# Patient Record
Sex: MALE | Race: WHITE
[De-identification: names, ages, dates, MRNs, and addresses within clinical notes are randomized per-mention and may not be internally consistent; named-entity substitution may affect disease eponyms.]

---

## 2017-07-31 NOTE — EDM.PDOC
ED HPI GENERAL MEDICAL PROBLEM





- General


Chief Complaint: General


Stated Complaint: L wrist pain


Time Seen by Provider: 07/31/17 18:10


Source of Information: Reports: Patient, Old Records ([facility] chart/EMR), Significant Other


History Limitations: Reports: No Limitations





- History of Present Illness


INITIAL COMMENTS - FREE TEXT/NARRATIVE: 





The patient was brought to the emergency room via private automobile by his 

significant other for evaluation of persistent intermittent left wrist pain, 

which is aggravated with flexion. He apparently had a Workmen's Compensation 

injury at about 16:00 hours on 7/27 when he sprained his left wrist while 

lifting a tire. He did report this to his employer the next day, however has 

not had this evaluated to this point. Symptoms of been refractory to ibuprofen 

800 mg, which was last taken 2 days ago and some additional topical ice 

therapy. He is right-handed and has not injured his left wrist in the past. He 

denies any paresthesias, neurological deficits, neck/back pain, weakness, or 

other complaints or injuries. He was able to complete his previous work shift 

and has also worked since the above injury without significant discomfort. The 

patient denies any chest pain/pressure, heart flutter, dizziness, orthostasis, 

orthopnea, diaphoresis, paresthesias, recent decreased exercise tolerance, or 

any other anginal-type symptoms. No recent history of abdominal pain, heartburn

, nausea, diarrhea, melena, gross hematochezia, or any food intolerance, 

including fatty foods, etc.. The patient also denies any recent fever, cough, 

wheezing, dyspnea, etc.. He complained of 9/10 pain yesterday with some 

improved 6/10 pain today.


Onset: Unknown/Unsure


Onset Date: 07/27/17


Onset Time: 16:00


Duration: Constant, Getting Worse


Location: Reports: Upper Extremity, Left


Quality: Reports: Same as Previous Episode, Sharp


Severity: Moderate


Improves with: Reports: Rest


Worsens with: Reports: Movement


Context: Reports: Trauma (As above)


Associated Symptoms: Denies: Confusion, Chest Pain, Cough, Diaphoresis, Fever/

Chills, Nausea/Vomiting, Shortness of Breath, Weakness


Treatments PTA: Reports: Cold Therapy, NSAIDS


  ** Left Wrist


Pain Score (Numeric/FACES): 6





- Related Data


 Allergies











Allergy/AdvReac Type Severity Reaction Status Date / Time


 


codeine Allergy  Other Verified 07/31/17 18:11














Past Medical History


HEENT History: Reports: None.  Denies: Allergic Rhinitis, Cataract, Glaucoma, 

Hard of Hearing, Impaired Vision, Macular Degeneration, Retinal Detachment


Cardiovascular History: Reports: None.  Denies: Afib, Aneurysm, Arrhythmia, 

Blood Clots/VTE/DVT, CAD, Heart Murmur, High Cholesterol, Hypertension, MI, 

Syncope


Respiratory History: Reports: Asthma, Other (See Below).  Denies: COPD, 

Intubation, Previous, PE, Pneumothorax, TB


Other Respiratory History: Asthma by chest x-ray on 10/17/16 with no previous 

evaluation


Gastrointestinal History: Reports: None.  Denies: Celiac Disease, Chronic 

Constipation, Chronic Diarrhea, Gastritis, GERD, GI Bleed, Hepatitis, 

Inflammatory Bowel Disease, Irritable Bowel Syndrome, Pancreatitis


Genitourinary History: Reports: STD, Other (See Below)


Other Genitourinary History: Chlamydia exposure on 12/12/15 with treatment


Musculoskeletal History: Reports: None.  Denies: Amputation, Arthritis, Back 

Pain, Chronic, Fracture, Gout, Neck Pain, Chronic, Osteoarthritis, RA, SLE


Neurological History: Reports: Brain Injury, Concussion, Head Trauma, Other (

See Below).  Denies: Alzheimers Disease, Cerebral Aneurysms, Headaches, Chronic

, Migraines, Seizure


Other Neuro History: Head concussion secondary to bull riding accident on 6/25/ 16


Psychiatric History: Reports: None.  Denies: Abuse, Victim of, ADD, ADHD, 

Addiction, Anxiety, Depression, Psych Hospitalization(s), PTSD, Suicide Attempt

, Suicidal Ideation


Endocrine/Metabolic History: Reports: None.  Denies: Diabetes, Type I, Diabetes

, Type II, Hypothyroidism, IDDM


Hematologic History: Reports: None.  Denies: Anemia, Blood Transfusion(s)


Immunologic History: Reports: None.  Denies: AIDS, HIV, SLE


Oncologic (Cancer) History: Reports: None.  Denies: Basal Cell Carcinoma, 

Hodgkin's Lymphoma, Leukemia, Lymphoma, Malignant Melanoma, Non-Hodgkin's 

Lymphoma, Squamous Cell Carcinoma


Dermatologic History: Reports: None.  Denies: Eczema, Psoriasis





- Infectious Disease History


Infectious Disease History: Reports: Chicken Pox.  Denies: C-Difficile, Measles

, Meningitis, Mononucleosis, MRSA, Mumps, Pertussis (Whooping Cough), Rheumatic 

Fever, RSV, Rubella, Scarlet Fever, Shingles, TB





- Past Surgical History


Head Surgeries/Procedures: Reports: None


HEENT Surgical History: Reports: Oral Surgery, Other (See Below).  Denies: 

Adenoidectomy, Eye Surgery, Laser Surgery, LASIK, Naso-Sinus Surgery, 

Tonsillectomy


Other HEENT Surgeries/Procedures: [place] teeth extraction 4 at about age 17, 

complete upper teeth extraction at about age 17


Cardiovascular Surgical History: Reports: None.  Denies: Varicose, Vascular 

Surgery


Respiratory Surgical History: Reports: None.  Denies: Lung Biopsies, 

Thoracentesis


GI Surgical History: Reports: None.  Denies: Appendectomy, Cholecystectomy, 

Colonoscopy, EGD, Hernia, Abdominal, Hernia, Inguinal, Hernia Repair/Other


Male  Surgical History: Reports: Circumcision, Other (See Below)


Other Male  Surgeries/Procedures: Circumcision as an infant


Endocrine Surgical History: Reports: None.  Denies: Thyroid Biopsy


Neurological Surgical History: Denies: C-Spine, Discectomy, Laminectomy, Lumbar 

Spine, Spinal Fusion, Vertebroplasty


Musculoskeletal Surgical History: Reports: None.  Denies: Arthroscopic Procedure

, Carpal Tunnel, Ganglion Cyst, Joint Replacement, ORIF, Shoulder Surgery


Oncologic Surgical History: Reports: None


Dermatological Surgical History: Reports: None





- Past Imaging History


Past Imaging History: Reports: CAT Scan (CT of the head and C-spine on 6/25/16, 

CT of the abdomen and pelvis on 9/20/15 and 6/25/16), MRA (Head on 6/27/16)





Social & Family History





- Tobacco Use


Smoking Status *Q: Current Every Day Smoker


Tobacco Use Within Last Twelve Months: Cigarettes


Years of Tobacco use: 7


Packs/Tins Daily: 1 (Smoking at age 14)


Used Tobacco, but Quit: No


Smoking Cessation Information Provided To Patient: Yes


Smoking Cessation Information Given Comment: Significant other smokes


Second Hand Smoke Exposure: Yes


Second Hand Smoke Education Provided: Yes





- Caffeine Use


Caffeine Use: Reports: Coffee (One cup per day), Energy Drinks (1 per month), 

Soda (2 sodas per day).  Denies: Tea


Caffeine Use Comment: Patient asked about one cup of coffee per day and 2 sodas 

per day





- Alcohol Use


Alcohol Use History: Yes


Days Per Week of Alcohol Use: 1 (No previous DWIs, problems with alcohol abuse, 

etc.)


Number of Drinks Per Day: 4 (Usually mixed drinks)


Total Drinks Per Week: 4


Alcohol Use in Last Twelve Months: Yes


Alcohol Use Frequency: Socially





- Recreational Drug Use


Recreational Drug Use: No


Drug Use in Last 12 Months: No


Recreational Drug Type: Denies: Amphetamines (Speed), Cocaine, LSD (Acid), 

Marijuana/Hashish, Methamphetamine, Morphine





- Sexual History


Sexual History: Reports: Multiple Partners, Other (See Below)


Other Sexual History Comment: Practicing safe sex at this time





- Living Situation & Occupation


Living situation: Reports: Single (No children), with Significant Other (And 

her son from another relationship)


Occupation: Employed (Previous , has been working at [employer] since 7/

10/17)





ED ROS GENERAL





- Review of Systems


Review Of Systems: ROS reveals no pertinent complaints other than HPI.





ED EXAM, GENERAL





- Physical Exam


Exam: See Below


Exam Limited By: No Limitations


General Appearance: Alert, WD/WN, No Apparent Distress


Neck: Normal Inspection, Supple, Non-Tender, Full Range of Motion.  No: 

Lymphadenopathy (L), Lymphadenopathy (R), Thyromegaly


Respiratory/Chest: No Respiratory Distress, Lungs Clear, Normal Breath Sounds, 

No Accessory Muscle Use, Chest Non-Tender.  No: Pleural Rub, Retractions


Cardiovascular: Normal Peripheral Pulses, Regular Rate, Rhythm, No Edema, No 

Gallop, No JVD, No Murmur, No Rub.  No: Gallop/S3, Gallop/S4, Friction Rub


Peripheral Pulses: 4+: Radial (L), Radial (R)


GI/Abdominal: Normal Bowel Sounds, Soft, Non-Tender, No Organomegaly, No 

Distention, No Abnormal Bruit, No Mass, Pelvis Stable.  No: Guarding


 (Male) Exam: Deferred


Rectal (Males) Exam: Deferred


Back Exam: Normal Inspection, Full Range of Motion.  No: CVA Tenderness (L), 

CVA Tenderness (R), Muscle Spasm


Extremities: Normal Range of Motion, No Pedal Edema, Normal Capillary Refill, 

Other (Mild palpation pain over the extensor surface of the left wrist with no 

crepitation, deformity, effusion, swelling, local signs of infection, etc. no 

snuffbox tenderness).  No: Joint Swelling, Kamlesh's Sign, Increased Warmth, 

Redness


Neurological: Alert, Oriented, CN II-XII Intact, Normal Cognition, Normal Gait, 

Normal Reflexes, No Motor/Sensory Deficits


Psychiatric: Normal Affect, Normal Mood


Skin Exam: Warm, Dry, Intact, Normal Color, No Rash, Tattoo(s) (Multiple).  No: 

Diaphoretic, Wound/Incision


Lymphatic: No Adenopathy





ED GENERAL MEDICAL PROCEDURES





- Splinting


  ** Left Upper Extremity


Pre-procedure NV status: Normal


Post-procedure NV status: Normal


Splint Material: Other (Cock up wrist splint)


Splint Design: Other (As above)


Applied & Form Fitted By: Nurse


Provider Post-Splint Application NV Check: NV Status Normal, Good Position


Complications: No





Course





- Vital Signs


Last Recorded V/S: 


 Last Vital Signs











Temp  37.3 C   07/31/17 18:15


 


Pulse  79   07/31/17 18:15


 


Resp  16   07/31/17 18:15


 


BP  142/82 H  07/31/17 18:15


 


Pulse Ox  98   07/31/17 18:15














 Vital Signs - 24 hr











  07/31/17





  18:15


 


Temperature [ 37.3 C





Temporal] 


 


Pulse, 79





Peripheral [ 





Pulse Oximetry] 


 


Respiratory 16





Rate 


 


Blood Pressure 142/82 H





[Left Upper Arm 





] 


 


O2 Sat by Pulse 98





Oximetry 














- Orders/Labs/Meds


Orders: 


 Active Orders 24 hr











 Category Date Time Status


 


 Wrist Comp Min 3V Lt [CR] Stat Exams  07/31/17 18:20 Taken


 


 Durable Medical Equipment for Discharge [DME for Oth  07/31/17 18:43 Ordered





 Discharge] [COMM] Routine   


 


 Obtain Past Medical Record [OM.PC] Routine Ot  07/31/17 18:20 Active











Labs: 


None


Meds: 


None





- Radiology Interpretation


Free Text/Narrative:: 





X-rays of the left wrist, complete, shows no evidence of fracture, dislocation, 

significant arthritic changes, etc.





Departure





- Departure


Time of Disposition: 19:00


Disposition: Home, Self-Care 01


Condition: Good


Clinical Impression: 


 Tobacco abuse counseling





Left wrist sprain


Qualifiers:


 Encounter type: initial encounter Qualified Code(s): S63.502A - Unspecified 

sprain of left wrist, initial encounter





Asthma


Qualifiers:


 Asthma severity: unspecified severity Asthma complication type: uncomplicated 

Qualified Code(s): J45.909 - Unspecified asthma, uncomplicated








- Discharge Information


Instructions:  Wrist Pain, Easy-to-Read, Wrist Sprain


Referrals: 


PCP,None [Primary Care Provider] - 


Forms:  ED Department Discharge, ED Return to Work/School Form


Additional Instructions: 


1.  Followup with your regular provider in 7-10 days as directed.


2.  Tylenol 650 mg by mouth every 4 hours and/or OTC ibuprofen 2-3 tabs by 

mouth every 6 hours with food as directed./needed.


3.  Work excuse- See Form


4.  BenGay or equivalent, heating pad, and/or ice packs as directed.


5.  Stop all tobacco use ASAP as directed/per provided information and consider 

contacting Quit LIne, etc..


6.  Stop all energy drink use ASAP


7.  Wear cock up wrist splint at all times with exception of bathing as 

discussed until otherwise directed by your regular provider





- Problem List & Annotations


(1) Left wrist sprain


SNOMED Code(s): 71981688


   Code(s): S63.502A - UNSPECIFIED SPRAIN OF LEFT WRIST, INITIAL ENCOUNTER   

Status: Acute   Priority: High   Onset Date: 07/27/17   Annotation/Comment:: 

Mild left wrist sprain. The patient feels that he can perform his normal duties 

without difficulty and has done so since the of injury as above. Symptomatic 

relief as per discharge instructions. Patient was placed in a cock up wrist 

splint, which should be worn at all times until otherwise directed. No activity 

restrictions, etc. for now per the patient's request. Work excuse and Workmen's 

Compensation forms were completed   


Qualifiers: 


   Encounter type: initial encounter   Qualified Code(s): S63.502A - 

Unspecified sprain of left wrist, initial encounter   





(2) Asthma


SNOMED Code(s): 734708216


   Code(s): J45.909 - UNSPECIFIED ASTHMA, UNCOMPLICATED   Status: Acute   

Priority: Medium   Onset Date: 10/17/16   Annotation/Comment:: Suspected asthma 

by chest x-ray.  PFTs recommended in the past, however the patient has not had 

any evaluation to this point. Borderline low-grade fever today, however no 

recent history of fever at home, bronchitic type symptoms, etc.   


Qualifiers: 


   Asthma severity: unspecified severity   Asthma complication type: 

uncomplicated   Qualified Code(s): J45.909 - Unspecified asthma, uncomplicated 

  





(3) Tobacco abuse counseling


SNOMED Code(s): 141974463, 315666991, 296777813


   Code(s): Z71.6 - TOBACCO ABUSE COUNSELING   Status: Chronic   Priority: 

Medium   Annotation/Comment:: Patient and his significant other were counseled 

extensively concerning the importance of tobacco cessation ASAP with 

information provided   





- Problem List Review


Problem List Initiated/Reviewed/Updated: Yes





- My Orders


Last 24 Hours: 


My Active Orders





07/31/17 18:20


Wrist Comp Min 3V Lt [CR] Stat 


Obtain Past Medical Record [OM.PC] Routine 





07/31/17 18:43


Durable Medical Equipment for Discharge [DME for Discharge] [COMM] Routine 














- Assessment/Plan


Last 24 Hours: 


My Active Orders





07/31/17 18:20


Wrist Comp Min 3V Lt [CR] Stat 


Obtain Past Medical Record [OM.PC] Routine 





07/31/17 18:43


Durable Medical Equipment for Discharge [DME for Discharge] [COMM] Routine 











Assessment:: 





As above


Plan: 





As above. Extensive precautions were given to the patient and his significant 

other, who are in agreement with the treatment plan. See Patient Instructions 

for further treatment and plan.

## 2021-11-04 ENCOUNTER — HOSPITAL ENCOUNTER (EMERGENCY)
Dept: HOSPITAL 52 - LL.ED | Age: 26
Discharge: HOME | End: 2021-11-04
Payer: COMMERCIAL

## 2021-11-04 VITALS — HEART RATE: 72 BPM | SYSTOLIC BLOOD PRESSURE: 132 MMHG | DIASTOLIC BLOOD PRESSURE: 71 MMHG

## 2021-11-04 DIAGNOSIS — Z20.822: ICD-10-CM

## 2021-11-04 DIAGNOSIS — Z88.5: ICD-10-CM

## 2021-11-04 DIAGNOSIS — Z72.0: ICD-10-CM

## 2021-11-04 DIAGNOSIS — Z79.899: ICD-10-CM

## 2021-11-04 DIAGNOSIS — K81.0: Primary | ICD-10-CM

## 2021-11-04 DIAGNOSIS — J45.909: ICD-10-CM

## 2021-11-04 LAB
ANION GAP SERPL CALC-SCNC: 9 MEQ/L (ref 7–15)
CHLORIDE SERPL-SCNC: 104 MMOL/L (ref 98–107)
SODIUM SERPL-SCNC: 142 MMOL/L (ref 136–145)

## 2021-11-04 RX ADMIN — KETOROLAC TROMETHAMINE ONE MG: 30 INJECTION, SOLUTION INTRAMUSCULAR at 04:02

## 2021-11-04 RX ADMIN — IOPAMIDOL ONE ML: 612 INJECTION, SOLUTION INTRAVENOUS at 05:11

## 2021-11-04 RX ADMIN — Medication PRN ML: at 04:04

## 2021-11-04 RX ADMIN — DIATRIZOATE MEGLUMINE AND DIATRIZOATE SODIUM ONE ML: 660; 100 LIQUID ORAL; RECTAL at 05:14

## 2021-11-04 RX ADMIN — ONDANSETRON ONE MG: 2 INJECTION, SOLUTION INTRAMUSCULAR; INTRAVENOUS at 04:03
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
## 2023-01-27 LAB
LAB DIRECTOR COMMENTS: ABNORMAL
LAB DIRECTOR DISCLAIMER: ABNORMAL
LAB DIRECTOR INTERPRETATION: ABNORMAL
LAB DIRECTOR METHODOLOGY: ABNORMAL
LAB DIRECTOR RESULTS: ABNORMAL
SPECIMEN DESCRIPTION: ABNORMAL

## 2023-01-27 PROCEDURE — 87624 HPV HI-RISK TYP POOLED RSLT: CPT

## 2023-02-06 ENCOUNTER — LAB REQUISITION (OUTPATIENT)
Dept: LAB | Facility: CLINIC | Age: 28
End: 2023-02-06

## 2023-02-06 DIAGNOSIS — Z11.3 ENCOUNTER FOR SCREENING FOR INFECTIONS WITH A PREDOMINANTLY SEXUAL MODE OF TRANSMISSION: ICD-10-CM

## 2023-04-01 ENCOUNTER — HOSPITAL ENCOUNTER (EMERGENCY)
Dept: HOSPITAL 38 - CC.ED | Age: 28
Discharge: HOME | End: 2023-04-01
Payer: MEDICAID

## 2023-04-01 DIAGNOSIS — M25.561: ICD-10-CM

## 2023-04-01 DIAGNOSIS — Z88.5: ICD-10-CM

## 2023-04-01 DIAGNOSIS — J45.909: ICD-10-CM

## 2023-04-01 DIAGNOSIS — G89.29: Primary | ICD-10-CM

## 2023-04-01 DIAGNOSIS — Z79.899: ICD-10-CM

## 2023-04-02 VITALS — DIASTOLIC BLOOD PRESSURE: 82 MMHG | SYSTOLIC BLOOD PRESSURE: 140 MMHG | HEART RATE: 89 BPM
